# Patient Record
Sex: FEMALE | Race: WHITE
[De-identification: names, ages, dates, MRNs, and addresses within clinical notes are randomized per-mention and may not be internally consistent; named-entity substitution may affect disease eponyms.]

---

## 2017-01-30 ENCOUNTER — HOSPITAL ENCOUNTER (OUTPATIENT)
Dept: HOSPITAL 39 - GMAB | Age: 62
End: 2017-01-30
Attending: FAMILY MEDICINE
Payer: COMMERCIAL

## 2017-01-30 DIAGNOSIS — Z00.01: Primary | ICD-10-CM

## 2017-02-10 ENCOUNTER — HOSPITAL ENCOUNTER (OUTPATIENT)
Dept: HOSPITAL 39 - CT | Age: 62
Discharge: HOME | End: 2017-02-10
Attending: FAMILY MEDICINE
Payer: SELF-PAY

## 2017-02-10 DIAGNOSIS — R10.12: Primary | ICD-10-CM

## 2017-02-10 NOTE — CT
EXAM DESCRIPTION:

CT ABDOMEN PELVIS WITHOUT THEN WITH IV CONTRAST



CLINICAL HISTORY:

UPPER QUAD PAIN



COMPARISON:

March 18, 2008



TECHNIQUE:

Pre and post contrast CT images of the abdomen and pelvis are obtained. 

CT scan done according to ALARA (As Low As Reasonably Achievable).



FINDINGS:

Visualized lung bases show no acute findings.



The liver, spleen, pancreas, and adrenal glands are unremarkable.

Surgical clips from cholecystectomy are seen without complicating

features.

Moderate vascular calcifications are identified.



Kidneys show no abnormal calcifications. No ureteral calcification or

obstruction is identified.



Urinary bladder fills with contrast on delayed images and is unremarkable.

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 The ovaries are not identified.

The appendix is within normal limits.

No small bowel obstruction is seen.

Colon shows mild diverticuli in the sigmoid region without associated

inflammatory changes or fluid collections.



No pathologically enlarged abdominal or retroperitoneal lymphadenopathy.



Osseous structures show no aggressive bony lesions. Degenerative changes

of the spine are seen.



IMPRESSION:

No acute findings on CT of the abdomen and pelvis.



Postsurgical changes from cholecystectomy and hysterectomy are noted.



Mild colon diverticulosis without CT evidence of diverticulitis.



No evidence of nephrolithiasis on CT examination.



Electronically signed by: Pratik Dill MD 02/10/2017 15:14

## 2018-04-23 ENCOUNTER — HOSPITAL ENCOUNTER (OUTPATIENT)
Dept: HOSPITAL 39 - GMAB | Age: 63
End: 2018-04-23
Attending: FAMILY MEDICINE
Payer: COMMERCIAL

## 2018-04-23 DIAGNOSIS — Z00.01: Primary | ICD-10-CM

## 2019-03-18 ENCOUNTER — HOSPITAL ENCOUNTER (OUTPATIENT)
Dept: HOSPITAL 39 - GMAE | Age: 64
End: 2019-03-18
Attending: FAMILY MEDICINE
Payer: MEDICARE

## 2019-03-18 DIAGNOSIS — I10: Primary | ICD-10-CM

## 2020-04-09 ENCOUNTER — HOSPITAL ENCOUNTER (OUTPATIENT)
Dept: HOSPITAL 39 - GMAE | Age: 65
End: 2020-04-09
Attending: FAMILY MEDICINE
Payer: MEDICARE

## 2020-04-09 DIAGNOSIS — R74.9: ICD-10-CM

## 2020-04-09 DIAGNOSIS — I10: ICD-10-CM

## 2020-04-09 DIAGNOSIS — R94.5: Primary | ICD-10-CM

## 2020-04-10 ENCOUNTER — HOSPITAL ENCOUNTER (OUTPATIENT)
Dept: HOSPITAL 39 - US | Age: 65
End: 2020-04-10
Attending: FAMILY MEDICINE
Payer: MEDICARE

## 2020-04-10 DIAGNOSIS — N28.9: ICD-10-CM

## 2020-04-10 DIAGNOSIS — Z90.49: ICD-10-CM

## 2020-04-10 DIAGNOSIS — K76.0: ICD-10-CM

## 2020-04-10 DIAGNOSIS — R94.5: Primary | ICD-10-CM

## 2020-04-10 NOTE — US
EXAM DESCRIPTION: 

Liver: ULTRASOUND.



CLINICAL HISTORY: 

ABN RESULTS OF LIVER FUNCTION STUDIES



COMPARISON: 

CT scan abdomen 2017.



TECHNIQUE: 

Transabdominal scannin-dimensional and Doppler modes.



FINDINGS: 

Gallbladder: Surgically absent. No fluid in the gallbladder

fossa. Non-tender with transducer pressure.

Common bile duct: caliber 6.9 mm within normal limits. 

Liver:  normal echogenicity; contour liver capsule smooth where

seen. No fluid around the liver. Intrahepatic biliary ducts

normal caliber.  Doppler hepatopedal flow portal vein. 6 mm  Long

axis right lobe 13.3 cm.

Pancreas: normal size and echogenicity.  Duct not seen. 

Right kidney: long axis measures 8.3 cm. Normal cortical

echogenicity. 10 mm cortical thickness.  No echogenic stones or

hydronephrosis.

Aorta proximal: 1.8 cm normal diameter.



IMPRESSION: 

1. Fatty liver normal size. Normal ducts and physiologic

vascularity. Lungs are unremarkable. Pancreas negative.

2. Prior cholecystectomy. Slight dilation of the common bile duct

physiologic postcholecystectomy. Normal caliber of the abdominal

aorta.

3. Right kidney with thin cortex. Otherwise unremarkable.

Correlate for medical renal disease.



Electronically signed by:  Lucho Whitlock MD  4/10/2020 9:39 AM CDT

Workstation: [id number]

## 2020-07-28 ENCOUNTER — HOSPITAL ENCOUNTER (OUTPATIENT)
Dept: HOSPITAL 39 - GMAE | Age: 65
End: 2020-07-28
Attending: FAMILY MEDICINE
Payer: MEDICARE

## 2020-07-28 DIAGNOSIS — R74.9: Primary | ICD-10-CM

## 2020-07-28 DIAGNOSIS — R94.5: ICD-10-CM

## 2020-08-13 ENCOUNTER — HOSPITAL ENCOUNTER (OUTPATIENT)
Dept: HOSPITAL 39 - MAMMO | Age: 65
End: 2020-08-13
Attending: FAMILY MEDICINE
Payer: MEDICARE

## 2020-08-13 DIAGNOSIS — Z12.31: Primary | ICD-10-CM

## 2020-08-14 NOTE — MAM
EXAM DESCRIPTION: 

3D Screening BILATERAL : Digital Mammography.



CLINICAL HISTORY: 

65 years Female SCREEN . No complaints. Menarche age 10.

Childbirth age 46. Menopause age unknown. Previous benign biopsy

left breast. Currently on HRT.. Lifetime risk of developing

breast cancer (Tyrer-Cuzick model)(%):  10.0.



COMPARISON: 

Baseline study at this facility..  No prior reports available.  



TECHNIQUE: 

Bilateral CC and MLO projection full-field images, digital

tomosynthesis mammographic technique. Bilateral digital 2-D

full-field MLO images.  CAD available for 2-D images.  



FINDINGS: 

The breast parenchymal density pattern is:  Scattered areas of

fibroglandular density.   No skin thickening or nipple

retraction.  Large coarse calcification in the posterior third of

the breast near the posterior nipple line with nearby

architectural distortion may be related to site of prior biopsy

or trauma. Solitary microcalcifications. Vascular calcifications.

Intramammary lymph nodes. Nodular fibroglandular elements. Mass

density with smooth margins at the 5:30 position of the middle

third right breast. Mass density versus focal asymmetry right

breast 2 cm lateral from the nipple at 9:00. Partially

circumscribed mass and partially asymmetric density in the middle

third of the left breast at the 1:30-2:00 position. Focal

asymmetry left breast 3 cm from the nipple at 3:00.



IMPRESSION: 

BI-RADS CATEGORY: 0 - INCOMPLETE- Need additional imaging

evaluation.



RECOMMENDATIONS:

FOLLOW-UP: Recall for additional imaging: Bilateral LM projection

3-D tomosynthesis full field images. Bilateral directed

ultrasound to the regions of interest..



Written communication concerning the IMPRESSION and Follow-up,

will be mailed to the patient and referring health care provider.



Electronically signed by:  Lucho Whitlock MD  8/14/2020 4:10 PM CDT

Workstation: [id number]